# Patient Record
Sex: MALE | URBAN - METROPOLITAN AREA
[De-identification: names, ages, dates, MRNs, and addresses within clinical notes are randomized per-mention and may not be internally consistent; named-entity substitution may affect disease eponyms.]

---

## 2023-02-26 ENCOUNTER — HOSPITAL ENCOUNTER (EMERGENCY)
Facility: MEDICAL CENTER | Age: 58
End: 2023-02-26
Attending: EMERGENCY MEDICINE
Payer: MEDICARE

## 2023-02-26 VITALS
TEMPERATURE: 98 F | DIASTOLIC BLOOD PRESSURE: 69 MMHG | OXYGEN SATURATION: 95 % | HEIGHT: 65 IN | SYSTOLIC BLOOD PRESSURE: 132 MMHG | WEIGHT: 165 LBS | BODY MASS INDEX: 27.49 KG/M2 | RESPIRATION RATE: 16 BRPM | HEART RATE: 88 BPM

## 2023-02-26 DIAGNOSIS — F10.920 ALCOHOLIC INTOXICATION WITHOUT COMPLICATION (HCC): ICD-10-CM

## 2023-02-26 PROCEDURE — 99284 EMERGENCY DEPT VISIT MOD MDM: CPT

## 2023-02-26 ASSESSMENT — LIFESTYLE VARIABLES
DO YOU DRINK ALCOHOL: YES
HAVE YOU EVER FELT YOU SHOULD CUT DOWN ON YOUR DRINKING: NO
EVER HAD A DRINK FIRST THING IN THE MORNING TO STEADY YOUR NERVES TO GET RID OF A HANGOVER: NO
EVER FELT BAD OR GUILTY ABOUT YOUR DRINKING: NO
HAVE PEOPLE ANNOYED YOU BY CRITICIZING YOUR DRINKING: NO
CONSUMPTION TOTAL: INCOMPLETE
TOTAL SCORE: 0

## 2023-02-26 NOTE — ED NOTES
.Patient discharged with friend in stable condition per orders.Wristband removed per protocol. Patient verbalized understanding of all discharge instructions. All belongings accounted for.

## 2023-02-26 NOTE — ED PROVIDER NOTES
"ED Provider Note    CHIEF COMPLAINT  Chief Complaint   Patient presents with    Alcohol Intoxication     Pt reports to drinking \"2 pints of alcohol\", unable to answer what type of alcohol. Pt visibly intoxicated, POC breathalyzer 0.161       EXTERNAL RECORDS REVIEWED  No prior encounters in our EMR.    HPI/ROS  LIMITATION TO HISTORY   Intoxication    OUTSIDE HISTORIAN(S):  Friend via telephone  EMS    Ross Duane Saldana is a 57 y.o. male who presents via EMS.  History is limited from the patient.  He reports drinking alcohol.  His speech is slurred.  He states he presented to the [facility] but his reasons for transfer here to the emergency department are unclear at this time.  His overall well-appearing but clearly appears intoxicated.  He has reassuring vital signs.    PAST MEDICAL HISTORY   Head injury  Alcohol abuse    SURGICAL HISTORY  patient denies any surgical history    FAMILY HISTORY  No family history on file.    SOCIAL HISTORY  Social History     Tobacco Use    Smoking status: Not on file    Smokeless tobacco: Not on file   Substance and Sexual Activity    Alcohol use: Not on file    Drug use: Not on file    Sexual activity: Not on file       CURRENT MEDICATIONS  Home Medications    **Home medications have not yet been reviewed for this encounter**         ALLERGIES  Not on File    PHYSICAL EXAM  VITAL SIGNS: /69   Pulse 88   Temp 36.7 °C (98 °F) (Temporal)   Resp 16   Ht 1.651 m (5' 5\")   Wt 74.8 kg (165 lb)   SpO2 95%   BMI 27.46 kg/m²    Vitals reviewed.  Constitutional: Patient is oriented to person, place, and time. Appears well-developed and well-nourished. No distress.    Head: Normocephalic and atraumatic.   Mouth/Throat: Oropharynx is clear and moist  Eyes: Conjunctivae are normal. Pupils are equal, round  Neck: Normal range of motion. Neck supple  Cardiovascular: Normal rate, regular rhythm and normal heart sounds.  Pulmonary/Chest: Effort normal and breath sounds normal. No " respiratory distress, no wheezes, rhonchi, or rales.  Abdominal: Soft. Bowel sounds are normal. There is no tenderness  Musculoskeletal: No edema and no tenderness.  Neurological: No focal deficits.   Skin: Skin is warm and dry. No erythema. No pallor.   Psychiatric: Patient has a normal mood and affect.       DIAGNOSTIC STUDIES / PROCEDURES      COURSE & MEDICAL DECISION MAKING    ED Observation Status? Yes; I am placing the patient in to an observation status due to a diagnostic uncertainty as well as therapeutic intensity. Patient placed in observation status at 6:24 AM, 2/26/2023.     Observation plan is as follows: Continued observation for sobriety and diagnostic uncertainty regarding whether this is related to intoxication or other pathology.    Upon Reevaluation, the patient's condition has: Improved.  His mentation is cleared.     Patient discharged from ED Observation status at 8:40 AM (Time) February 26, 2023 (Date).     INITIAL ASSESSMENT, COURSE AND PLAN  Care Narrative: This is an overall well-appearing 57-year-old male.  Slurred speech.  History limited.  Vital signs reassuring.  Highest suspicion is for intoxication although other pathologies are stable.  We will continue to monitor for p.o. sobriety and reevaluation.    0820AM patient is reevaluated at the bedside.  He sitting up at the bedside.  He is more awake and alert.  However, he does not know what city he is in.  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  A friend called and will be here shortly to pick him up.  Apparently, he does have a history of head injury and history of alcoholism.    8:45 AM, friend at the bedside who to pick the patient up.  Patient is not driving he is with them.  He is discharged in stable condition.    DISPOSITION AND DISCUSSIONS  I have discussed management of the patient with the following physicians and ABDOUL's: None    Discussion of management with other [facility] or appropriate source(s):  None    Escalation of care considered, and ultimately not performed: None    Barriers to care at this time, including but not limited to: Alcoholism, substance abuse.     Decision tools and prescription drugs considered including, but not limited to: None.    FINAL DIAGNOSIS  1. Alcoholic intoxication without complication (HCC)           Electronically signed by: Shahrzad Gayle D.O., 2/26/2023 6:04 AM

## 2023-02-26 NOTE — ED TRIAGE NOTES
"Chief Complaint   Patient presents with    Alcohol Intoxication     Pt reports to drinking \"2 pints of alcohol\", unable to answer what type of alcohol. Pt visibly intoxicated, POC breathalyzer 0.161      Pt BIB EMS from [facility] for the above complaint. [place]  Pt denies hx of alcohol withdrawal and states he only takes losartan at home    /72   Pulse 82   Temp 36.2 °C (97.2 °F) (Temporal)   Resp 18   Ht 1.651 m (5' 5\")   Wt 74.8 kg (165 lb)   SpO2 94% Comment: Room air  BMI 27.46 kg/m²    "